# Patient Record
Sex: MALE | Race: WHITE | NOT HISPANIC OR LATINO
[De-identification: names, ages, dates, MRNs, and addresses within clinical notes are randomized per-mention and may not be internally consistent; named-entity substitution may affect disease eponyms.]

---

## 2017-11-08 PROBLEM — Z00.00 ENCOUNTER FOR PREVENTIVE HEALTH EXAMINATION: Status: ACTIVE | Noted: 2017-11-08

## 2017-11-13 ENCOUNTER — APPOINTMENT (OUTPATIENT)
Dept: FAMILY MEDICINE | Facility: CLINIC | Age: 22
End: 2017-11-13
Payer: COMMERCIAL

## 2017-11-13 VITALS
SYSTOLIC BLOOD PRESSURE: 122 MMHG | RESPIRATION RATE: 13 BRPM | HEART RATE: 78 BPM | HEIGHT: 72 IN | DIASTOLIC BLOOD PRESSURE: 70 MMHG | WEIGHT: 190 LBS | BODY MASS INDEX: 25.73 KG/M2 | OXYGEN SATURATION: 98 %

## 2017-11-13 DIAGNOSIS — Z82.49 FAMILY HISTORY OF ISCHEMIC HEART DISEASE AND OTHER DISEASES OF THE CIRCULATORY SYSTEM: ICD-10-CM

## 2017-11-13 DIAGNOSIS — Z02.89 ENCOUNTER FOR OTHER ADMINISTRATIVE EXAMINATIONS: ICD-10-CM

## 2017-11-13 DIAGNOSIS — Z78.9 OTHER SPECIFIED HEALTH STATUS: ICD-10-CM

## 2017-11-13 PROCEDURE — 00024S: CUSTOM

## 2017-11-13 PROCEDURE — 00017S: CUSTOM

## 2017-11-13 PROCEDURE — 00012S: CUSTOM

## 2017-11-13 PROCEDURE — 00001S: CUSTOM

## 2017-11-14 PROBLEM — Z82.49 FAMILY HISTORY OF HYPERTENSION: Status: ACTIVE | Noted: 2017-11-14

## 2017-11-14 PROBLEM — Z78.9 NON-SMOKER: Status: ACTIVE | Noted: 2017-11-13

## 2017-11-14 PROBLEM — Z78.9 CONSUMES ALCOHOL AT SOCIAL EVENTS: Status: ACTIVE | Noted: 2017-11-13

## 2019-08-03 ENCOUNTER — TRANSCRIPTION ENCOUNTER (OUTPATIENT)
Age: 24
End: 2019-08-03

## 2019-10-25 ENCOUNTER — TRANSCRIPTION ENCOUNTER (OUTPATIENT)
Age: 24
End: 2019-10-25

## 2022-05-29 ENCOUNTER — NON-APPOINTMENT (OUTPATIENT)
Age: 27
End: 2022-05-29

## 2022-09-15 ENCOUNTER — EMERGENCY (EMERGENCY)
Facility: HOSPITAL | Age: 27
LOS: 1 days | Discharge: HOME | End: 2022-09-15
Attending: STUDENT IN AN ORGANIZED HEALTH CARE EDUCATION/TRAINING PROGRAM
Payer: COMMERCIAL

## 2022-09-15 ENCOUNTER — TRANSCRIPTION ENCOUNTER (OUTPATIENT)
Age: 27
End: 2022-09-15

## 2022-09-15 VITALS
HEART RATE: 66 BPM | RESPIRATION RATE: 18 BRPM | TEMPERATURE: 98 F | SYSTOLIC BLOOD PRESSURE: 131 MMHG | DIASTOLIC BLOOD PRESSURE: 96 MMHG | OXYGEN SATURATION: 98 %

## 2022-09-15 PROCEDURE — 73564 X-RAY EXAM KNEE 4 OR MORE: CPT | Mod: 26,LT

## 2022-09-15 PROCEDURE — 73564 X-RAY EXAM KNEE 4 OR MORE: CPT

## 2022-09-15 PROCEDURE — 29505 APPLICATION LONG LEG SPLINT: CPT

## 2022-09-15 PROCEDURE — 99283 EMERGENCY DEPT VISIT LOW MDM: CPT

## 2022-09-15 PROCEDURE — 99283 EMERGENCY DEPT VISIT LOW MDM: CPT | Mod: 25

## 2022-09-15 RX ORDER — IBUPROFEN 200 MG
1 TABLET ORAL
Qty: 21 | Refills: 0
Start: 2022-09-15 | End: 2022-09-21

## 2022-09-15 RX ORDER — IBUPROFEN 200 MG
800 TABLET ORAL ONCE
Refills: 0 | Status: COMPLETED | OUTPATIENT
Start: 2022-09-15 | End: 2022-09-15

## 2022-09-15 RX ADMIN — Medication 800 MILLIGRAM(S): at 21:01

## 2022-09-15 NOTE — ED PROVIDER NOTE - CARE PROVIDER_API CALL
Sarita Leiva)  Orthopedics  [address] [place] [address], [address]  [place] [address]  Phone: [phone]  Fax: [phone]  Follow Up Time: 4-6 Days
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
## 2022-09-15 NOTE — ED ADULT NURSE NOTE - NSIMPLEMENTINTERV_GEN_ALL_ED
Implemented All Fall Risk Interventions:  [place] to call system. Call bell, personal items and telephone within reach. Instruct patient to call for assistance. Room bathroom lighting operational. Non-slip footwear when patient is off stretcher. Physically safe environment: no spills, clutter or unnecessary equipment. Stretcher in lowest position, wheels locked, appropriate side rails in place. Provide visual cue, wrist band, yellow gown, etc. Monitor gait and stability. Monitor for mental status changes and reorient to person, place, and time. Review medications for side effects contributing to fall risk. Reinforce activity limits and safety measures with patient and family.

## 2022-09-15 NOTE — ED PROVIDER NOTE - CLINICAL SUMMARY MEDICAL DECISION MAKING FREE TEXT BOX
27 yr old M presented to ED for left knee pain s/p jujitsu injury. pt states that he was practicing when his knee was bent in an abnormal position. Pt admits to pain and hearing a popping sound . Pt says that he feels pain in the lateral aspect of his knee. Examination + knee tenderness on palpation and pt with normal sensation and limited ROM. Pt X-ray normal. placed in knee immobilizer and D/C with orthopedic F/U information.

## 2022-09-15 NOTE — ED PROCEDURE NOTE - CPROC ED POST RADIOGRAPHY1
post-procedure radiography performed/extremity correctly positioned, splinted
What Is The Reason For Today's Visit?: History of Melanoma
Year Excised?: 2019
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
## 2022-09-15 NOTE — ED PROVIDER NOTE - PATIENT PORTAL LINK FT
You can access the FollowMyHealth Patient Portal offered by [facility] by registering at the following website: [email or web address]. By joining [facility]’s FollowMyHealth portal, you will also be able to view your health information using other applications (apps) compatible with our system.

## 2022-09-15 NOTE — ED PROVIDER NOTE - NS ED ATTENDING STATEMENT MOD
This was a shared visit with the CRYSTAL. I reviewed and verified the documentation and independently performed the documented:

## 2022-09-15 NOTE — ED ADULT NURSE NOTE - OBJECTIVE STATEMENT
Patient a&ox4, respirations even and unlabored, complains of left knee pain after twisting his knee at [facility]. Patient denies any previous knee injuries, has not tried to bear weight yet.

## 2022-09-15 NOTE — ED PROVIDER NOTE - OBJECTIVE STATEMENT
27 yr old M presented to ED for left knee pain s/p jujitsu injury. pt states that he was practicing when his knee was bent in an abnormal position. Pt admits to pain and hearing a popping sound . Pt says that he feels pain in the lateral aspect of his knee. Pt also admits to pain with bending his knee and standing . pt denies any weakness, numbness or paraesthesia. Pt denies any significant past medial or surgical illness.

## 2022-09-16 ENCOUNTER — NON-APPOINTMENT (OUTPATIENT)
Age: 27
End: 2022-09-16

## 2022-09-19 ENCOUNTER — APPOINTMENT (OUTPATIENT)
Dept: ORTHOPEDIC SURGERY | Facility: CLINIC | Age: 27
End: 2022-09-19

## 2022-09-19 ENCOUNTER — NON-APPOINTMENT (OUTPATIENT)
Age: 27
End: 2022-09-19

## 2022-09-19 VITALS
SYSTOLIC BLOOD PRESSURE: 127 MMHG | WEIGHT: 180 LBS | BODY MASS INDEX: 24.38 KG/M2 | OXYGEN SATURATION: 99 % | DIASTOLIC BLOOD PRESSURE: 76 MMHG | HEART RATE: 65 BPM | HEIGHT: 72 IN

## 2022-09-19 PROCEDURE — 99204 OFFICE O/P NEW MOD 45 MIN: CPT

## 2022-09-20 NOTE — DISCUSSION/SUMMARY
[de-identified] : DAVID is a 27 year male here today for initial evaluation of his left knee. He states he was doing jiu jitsu on Thursday night 9/15/22 when he felt several pops and injured his knee. He is ambulating today with crutches and is wearing a leg brace. He initially went to [facility] and got xrays, were normal. He got an MRI from an urgent care. He states there is no pain when weight bearing, but twisting motions increase the pain. Patient denies any recent fevers, chills or night sweats. Patient denies any radiating pain, numbness, tingling sensations, burning sensations, urinary or bowel incontinence. \par \par We had a thorough discussion regarding the nature of his pain, the pathophysiology, as well as all treatment options. Based on the clinical exam, radiographs, and MRI reviewed today, the patient has a partial-thickness PCL tear. I discussed operative and non-operative treatment modalities. Patient was advised against surgical intervention at this time. He was transferred to another brace and a PCL brace was ordered today. Patient was given prescription of formal physical therapy that he will perform 2x/wk for 6-8 wks. He can discontinue use of crutches. Patient will follow up in 3-4 wks for repeat clinical assessment and stress view xrays. He can continue working next week as tolerated. All questions were answered and the patient verbalized understanding. The patient is in agreement with this treatment plan.

## 2022-09-20 NOTE — ADDENDUM
[FreeTextEntry1] : Documented by Alona Mccauley acting as a scribe for Dr. العراقي and Jb Merida PA-C on 09/19/2022. \par \par All medical record entries made by the Scribe were at my, Dr. العراقي, and Jb Merida's, direction and\par personally dictated by me on 09/19/2022. I have reviewed the chart and agree that the record\par accurately reflects my personal performance of the history, physical exam, procedure and imaging.

## 2022-09-20 NOTE — HISTORY OF PRESENT ILLNESS
[None] : No relieving factors are noted [de-identified] : DAVID is a 27 year male here today for initial evaluation of his left knee. He states he was doing jiu jitsu on Thursday night 9/15/22 when he felt several pops and injured his knee. He is ambulating today with crutches and is wearing a leg brace. He initially went to [facility] and got xrays, were normal. He got an MRI from an urgent care. He states there is no pain when weight bearing, but twisting motions increase the pain. Patient denies any recent fevers, chills or night sweats. Patient denies any radiating pain, numbness, tingling sensations, burning sensations, urinary or bowel incontinence. He presents with MRI of L knee performed at  which reveals;\par \par \par Partial-thickness PCL tear.\par \par Posterolateral corner injury with partial-thickness tearing of the fibular collateral ligament and popliteus myotendinous junction.\par \par Moderate-sized knee joint effusion.\par

## 2022-09-20 NOTE — PHYSICAL EXAM
[de-identified] : Physical Exam:\par General: Well appearing, no acute distress\par Neurologic: A&Ox3, No focal deficits\par Head: NCAT without abrasions, lacerations, or ecchymosis to head, face, or scalp\par Eyes: No scleral icterus, no gross abnormalities\par Respiratory: Equal chest wall expansion bilaterally, no accessory muscle use\par Lymphatic: No lymphadenopathy palpated\par Skin: Warm and dry\par Psychiatric: Normal mood and affect \par \par \par Left Kne\par Patient is well nourished, well-developed, in no acute distress, with appropriate mood and affect. The patient is A+Ox3. There is no inguinal adenopathy. Bilateral limbs are well-perfused, without skin lesions, shows a grossly normal motor and sensory examination. The left knee motion is significantly reduced and does cause significant pain. Pathologic quad activation, calf tenderness. At 30 and 90 dial slight increase in rotation relative to other knee with pain. Full extension, 105 flexion, ER to 90.  Muscle strength is normal. Pedal pulses are palpable.The knee exhibits a moderate effusion. Joint line tenderness noted [medial/lateral] joint line at 0 and 90 degrees. It is stable in valgus stress at 0 and 30 degrees.The knee exhibits a laxity in posterior drawer. 2+ posterior drawer.  Normal hip and ankle exam.   [de-identified] : DATE OF EXAM: 09/16/2022\par MRI-LEFT KNEE NON CONTRAST - [facility]IRI\par \par IMPRESSION:\par \par Partial-thickness PCL tear.\par \par Posterolateral corner injury with partial-thickness tearing of the fibular collateral ligament and popliteus myotendinous junction.\par \par Moderate-sized knee joint effusion.

## 2022-10-17 ENCOUNTER — APPOINTMENT (OUTPATIENT)
Dept: ORTHOPEDIC SURGERY | Facility: CLINIC | Age: 27
End: 2022-10-17

## 2022-10-17 PROCEDURE — 99214 OFFICE O/P EST MOD 30 MIN: CPT

## 2022-10-17 NOTE — HISTORY OF PRESENT ILLNESS
[de-identified] : DAVID is a 27 year male here today for follow up on left knee. He sustained a left PCL tear on 9/15/2022. He has been using the elliptical at physical therapy. He states he is back at work and is doing well. He was standing for a long time over the weekend and reports increased swelling. He states it is not painful right now. He presents with left knee brace today. \par

## 2022-10-17 NOTE — ADDENDUM
[FreeTextEntry1] : Documented by Alona Mccauley acting as a scribe for Dr. العراقي and Jb Merida PA-C on 10/17/2022. \par \par All medical record entries made by the Scribe were at my, Dr. العراقي, and Jb Merida's, direction and\par personally dictated by me on 10/17/2022. I have reviewed the chart and agree that the record\par accurately reflects my personal performance of the history, physical exam, procedure and imaging.

## 2022-10-17 NOTE — PHYSICAL EXAM
[de-identified] : Physical Exam:\par General: Well appearing, no acute distress\par Neurologic: A&Ox3, No focal deficits\par Head: NCAT without abrasions, lacerations, or ecchymosis to head, face, or scalp\par Eyes: No scleral icterus, no gross abnormalities\par Respiratory: Equal chest wall expansion bilaterally, no accessory muscle use\par Lymphatic: No lymphadenopathy palpated\par Skin: Warm and dry\par Psychiatric: Normal mood and affect \par \par \par Left Knee\par Patient is well nourished, well-developed, in no acute distress, with appropriate mood and affect. The patient is A+Ox3. There is no inguinal adenopathy. Bilateral limbs are well-perfused, without skin lesions, shows a grossly normal motor and sensory examination. The left knee motion is 0-140. Full extension.  Muscle strength is normal. Pedal pulses are palpable.The knee exhibits a moderate effusion. No joint line tenderness noted [medial/lateral] joint line at 0 and 90 degrees. It is stable in valgus stress at 0 and 30 degrees.The knee exhibits a laxity in posterior drawer. 2+ posterior drawer.  Normal hip and ankle exam. Grade 1 PCL with endpoint.

## 2022-10-17 NOTE — DISCUSSION/SUMMARY
[de-identified] : DAVID is a 27 year male here today for follow up on left knee. He sustained a left PCL tear on 9/15/2022. He has been using the elliptical at physical therapy. He states he is back at work and is doing well. He was standing for a long time over the weekend and reports increased swelling. He states it is not painful right now. He presents with left knee brace today. \par \par At this time, he is doing well and denies pain. He should continue physical therapy and home exercises. The swelling is likely due to the long period of standing over the weekend. Conservative measures of treatment include rest until asymptomatic, activity avoidance, NSAID's PRN, application to ice to the area 2-3x daily for 20 minutes, with gradual return to activities. He should continue use of the brace. Patient will follow up in 6-8 wks for repeat clinical assessment. All questions were answered and the patient verbalized understanding. The patient is in agreement with this treatment plan.

## 2022-11-29 ENCOUNTER — APPOINTMENT (OUTPATIENT)
Dept: ORTHOPEDIC SURGERY | Facility: CLINIC | Age: 27
End: 2022-11-29

## 2022-11-29 PROCEDURE — 99213 OFFICE O/P EST LOW 20 MIN: CPT

## 2022-11-29 NOTE — PHYSICAL EXAM
[de-identified] : Physical Exam:\par General: Well appearing, no acute distress\par Neurologic: A&Ox3, No focal deficits\par Head: NCAT without abrasions, lacerations, or ecchymosis to head, face, or scalp\par Eyes: No scleral icterus, no gross abnormalities\par Respiratory: Equal chest wall expansion bilaterally, no accessory muscle use\par Lymphatic: No lymphadenopathy palpated\par Skin: Warm and dry\par Psychiatric: Normal mood and affect \par \par \par Left Knee\par Patient is well nourished, well-developed, in no acute distress, with appropriate mood and affect. The patient is A+Ox3. There is no inguinal adenopathy. Bilateral limbs are well-perfused, without skin lesions, shows a grossly normal motor and sensory examination. The left knee motion is 0-140. Full extension.  Muscle strength is normal. Pedal pulses are palpable.The knee exhibits a moderate effusion. No joint line tenderness noted [medial/lateral] joint line at 0 and 90 degrees. It is stable in valgus stress at 0 and 30 degrees.The knee exhibits a laxity in posterior drawer. 2+ posterior drawer.  Normal hip and ankle exam. Grade 1 PCL with endpoint.  IT band tenderness. Slight tibial slope. [de-identified] : No new imaging done today.

## 2022-11-29 NOTE — DISCUSSION/SUMMARY
[de-identified] : DAVID is a 27 year male here today for follow up on left knee. He sustained a left PCL tear on 9/15/2022. He states he is doing well and has been doing PT with [facility]. He states he has been going to the gym. He feels 85-95% back to normal. He still has pain with leg curls and anything explosive. Denies paresthesias.\par \par \par We had a thorough discussion regarding the nature of his pain, the pathophysiology, as well as all treatment options. He should continue doing PT exercises at home and with exercise at the gym such as the elliptical and single leg eccentric exercises. If he is feeling comfortable he can start to do light work on the treadmill. He will follow up with me on an as needed basis. Patient will follow up in 6-8 wks for repeat clinical assessment. All questions were answered and the patient verbalized understanding. The patient is in agreement with this treatment plan.

## 2022-11-29 NOTE — ADDENDUM
[FreeTextEntry1] : Documented by Angie Gagnon acting as a scribe for Dr. العراقي and Jb Merida PA-C on 11/29/2022.   All medical record entries made by the Scribe were at my, Dr. العراقي, and Jb Merida's, direction and personally dictated by me on 11/29/2022. I have reviewed the chart and agree that the record accurately reflects my personal performance of the history, physical exam, procedure and imaging.

## 2022-11-29 NOTE — HISTORY OF PRESENT ILLNESS
[de-identified] : DAVID is a 27 year male here today for follow up on left knee. He sustained a left PCL tear on 9/15/2022. He states he is doing well and has been doing PT with [facility]. He states he has been going to the gym. He feels 85-95% back to normal. He still has pain with leg curls and anything explosive. Denies paresthesias.\par

## 2023-01-24 ENCOUNTER — APPOINTMENT (OUTPATIENT)
Dept: ORTHOPEDIC SURGERY | Facility: CLINIC | Age: 28
End: 2023-01-24
Payer: COMMERCIAL

## 2023-01-24 DIAGNOSIS — S83.529A SPRAIN OF POSTERIOR CRUCIATE LIGAMENT OF UNSPECIFIED KNEE, INITIAL ENCOUNTER: ICD-10-CM

## 2023-01-24 DIAGNOSIS — S89.92XS UNSPECIFIED INJURY OF LEFT LOWER LEG, SEQUELA: ICD-10-CM

## 2023-01-24 PROCEDURE — 99214 OFFICE O/P EST MOD 30 MIN: CPT

## 2023-01-24 NOTE — DISCUSSION/SUMMARY
[de-identified] : We had a thorough discussion regarding the nature of his pain, the pathophysiology, as well as all treatment options. Patient is doing well at this time. He should continue with strengthening and stretching at the gym. I recommended him to work on strengthening his quadriceps and stretching his IT band. He should begin doing some explosive exercises. He can begin doing exercises on the mat, but no contact. Patient will follow up in 12 wks for repeat clinical assessment. All questions were answered and the patient verbalized understanding. The patient is in agreement with this treatment plan.

## 2023-01-24 NOTE — ADDENDUM
[FreeTextEntry1] : Documented by Angie Gagnon acting as a scribe for Dr. العراقي and Jb Merida PA-C on 01/24/2023.   All medical record entries made by the Scribe were at my, Dr. العراقي, and Jb Merida's, direction and personally dictated by me on 01/24/2023. I have reviewed the chart and agree that the record accurately reflects my personal performance of the history, physical exam, procedure and imaging.

## 2023-01-24 NOTE — DISCUSSION/SUMMARY
[de-identified] : We had a thorough discussion regarding the nature of his pain, the pathophysiology, as well as all treatment options. Patient is doing well at this time. He should continue with strengthening and stretching at the gym. I recommended him to work on strengthening his quadriceps and stretching his IT band. He should begin doing some explosive exercises. He can begin doing exercises on the mat, but no contact. Patient will follow up in 12 wks for repeat clinical assessment. All questions were answered and the patient verbalized understanding. The patient is in agreement with this treatment plan.

## 2023-01-24 NOTE — PHYSICAL EXAM
[de-identified] : Physical Exam:\par General: Well appearing, no acute distress\par Neurologic: A&Ox3, No focal deficits\par Head: NCAT without abrasions, lacerations, or ecchymosis to head, face, or scalp\par Eyes: No scleral icterus, no gross abnormalities\par Respiratory: Equal chest wall expansion bilaterally, no accessory muscle use\par Lymphatic: No lymphadenopathy palpated\par Skin: Warm and dry\par Psychiatric: Normal mood and affect \par \par \par Left Knee\par Patient is well nourished, well-developed, in no acute distress, with appropriate mood and affect. The patient is A+Ox3. There is no inguinal adenopathy. Bilateral limbs are well-perfused, without skin lesions, shows a grossly normal motor and sensory examination. The left knee motion is 0-135. Full extension.  Muscle strength is normal. Pedal pulses are palpable.The knee exhibits a moderate effusion. No joint line tenderness noted [medial/lateral] joint line at 0 and 90 degrees. It is stable in valgus stress at 0 and 30 degrees.The knee exhibits a laxity in posterior drawer. 2+ posterior drawer.  Normal hip and ankle exam. Grade 1 PCL with endpoint.  IT band tenderness. Slight tibial slope.

## 2023-01-24 NOTE — HISTORY OF PRESENT ILLNESS
[de-identified] : DAVID is a 27 year male here today for follow up on left knee. He sustained a left PCL tear on 9/15/2022. He notes he has been back to the gym and strengthening himself. He has tried some kickboxing. He went surfing with little to no issues. \par

## 2023-01-24 NOTE — PHYSICAL EXAM
[de-identified] : Physical Exam:\par General: Well appearing, no acute distress\par Neurologic: A&Ox3, No focal deficits\par Head: NCAT without abrasions, lacerations, or ecchymosis to head, face, or scalp\par Eyes: No scleral icterus, no gross abnormalities\par Respiratory: Equal chest wall expansion bilaterally, no accessory muscle use\par Lymphatic: No lymphadenopathy palpated\par Skin: Warm and dry\par Psychiatric: Normal mood and affect \par \par \par Left Knee\par Patient is well nourished, well-developed, in no acute distress, with appropriate mood and affect. The patient is A+Ox3. There is no inguinal adenopathy. Bilateral limbs are well-perfused, without skin lesions, shows a grossly normal motor and sensory examination. The left knee motion is 0-135. Full extension.  Muscle strength is normal. Pedal pulses are palpable.The knee exhibits a moderate effusion. No joint line tenderness noted [medial/lateral] joint line at 0 and 90 degrees. It is stable in valgus stress at 0 and 30 degrees.The knee exhibits a laxity in posterior drawer. 2+ posterior drawer.  Normal hip and ankle exam. Grade 1 PCL with endpoint.  IT band tenderness. Slight tibial slope.

## 2023-01-24 NOTE — HISTORY OF PRESENT ILLNESS
[de-identified] : DAVID is a 27 year male here today for follow up on left knee. He sustained a left PCL tear on 9/15/2022. He notes he has been back to the gym and strengthening himself. He has tried some kickboxing. He went surfing with little to no issues. \par

## 2023-04-18 ENCOUNTER — APPOINTMENT (OUTPATIENT)
Dept: ORTHOPEDIC SURGERY | Facility: CLINIC | Age: 28
End: 2023-04-18

## 2023-11-17 ENCOUNTER — NON-APPOINTMENT (OUTPATIENT)
Age: 28
End: 2023-11-17

## 2024-02-06 ENCOUNTER — NON-APPOINTMENT (OUTPATIENT)
Age: 29
End: 2024-02-06

## 2024-02-06 ENCOUNTER — APPOINTMENT (OUTPATIENT)
Dept: ORTHOPEDIC SURGERY | Facility: CLINIC | Age: 29
End: 2024-02-06
Payer: COMMERCIAL

## 2024-02-06 DIAGNOSIS — S83.91XA SPRAIN OF UNSPECIFIED SITE OF RIGHT KNEE, INITIAL ENCOUNTER: ICD-10-CM

## 2024-02-06 PROCEDURE — 73564 X-RAY EXAM KNEE 4 OR MORE: CPT | Mod: RT

## 2024-02-06 PROCEDURE — 99214 OFFICE O/P EST MOD 30 MIN: CPT

## 2024-02-06 RX ORDER — NAPROXEN 500 MG/1
500 TABLET ORAL
Qty: 28 | Refills: 0 | Status: ACTIVE | COMMUNITY
Start: 2024-02-06 | End: 1900-01-01

## 2024-02-06 NOTE — HISTORY OF PRESENT ILLNESS
[de-identified] : DAVID is a 28 year male here today for new, right knee pain. He injured himself in [facility]. He sustained a left PCL tear on 9/15/2022 that he recovered well from and treated conservatively.

## 2024-02-06 NOTE — CONSULT LETTER
[Dear  ___] : Dear  [unfilled], [Consult Letter:] : I had the pleasure of evaluating your patient, [unfilled]. [Please see my note below.] : Please see my note below. [Sincerely,] : Sincerely, [FreeTextEntry3] : Dr. Mj العراقي

## 2024-02-06 NOTE — DISCUSSION/SUMMARY
[de-identified] : We had a thorough discussion regarding the nature of his pain, the pathophysiology, as well as all treatment options. A prescription of Naproxen was given to be taken as directed with food to prevent GI upset, if occurs pt to D/C and call us at that time. Considering the patient's current presentation of pain, physical exam, and radiographs an MRI is indicated at this time. A prescription for this was given to the patient. We will go over the MRI results with him upon obtaining the results in the office and advise him of further treatment options. He agrees with the above plan and all questions were answered.

## 2024-02-06 NOTE — ADDENDUM
[FreeTextEntry1] : Documented by Kika Silva acting as a scribe for Dr. العراقي on 02/06/2024. All medical record entries made by the Scribe were at my, Dr. العراقي's, direction and personally dictated by me on 02/06/2024. I have reviewed the chart and agree that the record accurately reflects my personal performance of the history, physical exam, procedure and imaging.

## 2024-02-06 NOTE — PHYSICAL EXAM
[de-identified] : General: Well appearing, no acute distress Neurologic: A&Ox3, No focal deficits Head: NCAT without abrasions, lacerations, or ecchymosis to head, face, or scalp Eyes: No scleral icterus, no gross abnormalities Respiratory: Equal chest wall expansion bilaterally, no accessory muscle use Lymphatic: No lymphadenopathy palpated Skin: Warm and dry Psychiatric: Normal mood and affect   Examination of the right knee reveals a significant effusion, no erythema or ecchymosis. There are no leg length discrepancies appreciated. The right knee is slightly larger than the left. The patient's range of motion is from 0-115 limited by pain. The patient has no pain with patella mobility or apprehension. The patient displays tenderness to palpation in the medial and lateral joint lines but more so in the medial joint line. There is no patella facet tenderness. The patient has a 4.5 out of 5 strength resisted straight leg raising as well as knee flexion and extension. The knee demonstrates 2+ laxity to Lachman testing, as well as anterior drawer. Unable to tolerate a pivot shift. Stable to posterior drawer. There is no valgus or varus instability. The patient has pain with Anthony and Grind Testing. The calf and thigh are soft, supple and nontender. The patient is grossly neurovascularly intact distally. [de-identified] : The following radiographs were ordered and read by me during this patient's visit. I reviewed each radiograph in detail with the patient and discussed the findings as highlighted below. 4 views of the right knee were obtained today that show no fracture, dislocation. There is no degenerative change seen. There is no malalignment. No obvious osseous abnormality. Otherwise unremarkable.

## 2024-02-07 ENCOUNTER — OUTPATIENT (OUTPATIENT)
Dept: OUTPATIENT SERVICES | Facility: HOSPITAL | Age: 29
LOS: 1 days | End: 2024-02-07
Payer: COMMERCIAL

## 2024-02-07 ENCOUNTER — APPOINTMENT (OUTPATIENT)
Dept: MRI IMAGING | Facility: CLINIC | Age: 29
End: 2024-02-07
Payer: COMMERCIAL

## 2024-02-07 DIAGNOSIS — S83.91XA SPRAIN OF UNSPECIFIED SITE OF RIGHT KNEE, INITIAL ENCOUNTER: ICD-10-CM

## 2024-02-07 PROCEDURE — 73721 MRI JNT OF LWR EXTRE W/O DYE: CPT

## 2024-02-07 PROCEDURE — 73721 MRI JNT OF LWR EXTRE W/O DYE: CPT | Mod: 26,RT

## 2024-02-12 ENCOUNTER — APPOINTMENT (OUTPATIENT)
Dept: ORTHOPEDIC SURGERY | Facility: CLINIC | Age: 29
End: 2024-02-12
Payer: COMMERCIAL

## 2024-02-12 VITALS — WEIGHT: 185 LBS | BODY MASS INDEX: 25.06 KG/M2 | HEIGHT: 72 IN

## 2024-02-12 PROCEDURE — 99214 OFFICE O/P EST MOD 30 MIN: CPT

## 2024-02-12 NOTE — DISCUSSION/SUMMARY
[de-identified] : We had a thorough discussion regarding the nature of his pain, the pathophysiology, as well as all treatment options. Based on clinical exam, MRI and radiograph findings they have a right ACL tear. I discussed operative and non-operative treatment modalities. All risks, benefits and alternatives to the proposed surgical procedure, right ACL reconstruction with quad tendon autograft, were discussed in great detail with the patient. Risks include but are not limited to pain, bleeding, infection, stiffness, medical complications (including DVT, PE, MI), and risks of anesthesia. The patient expressed understanding and all questions were answered. The patient is electing to proceed, and I will have the patient scheduled accordingly. I provided him contact number of my surgical coordinator Neto, who will go over dates for this procedure. All questions were answered.

## 2024-02-12 NOTE — PHYSICAL EXAM
[de-identified] : General: Well appearing, no acute distress Neurologic: A&Ox3, No focal deficits Head: NCAT without abrasions, lacerations, or ecchymosis to head, face, or scalp Eyes: No scleral icterus, no gross abnormalities Respiratory: Equal chest wall expansion bilaterally, no accessory muscle use Lymphatic: No lymphadenopathy palpated Skin: Warm and dry Psychiatric: Normal mood and affect   Examination of the right knee reveals a significant effusion, no erythema or ecchymosis. There are no leg length discrepancies appreciated. The right knee is slightly larger than the left. The patient's range of motion is from 0-115 limited by pain. The patient has no pain with patella mobility or apprehension. The patient displays tenderness to palpation in the medial and lateral joint lines but more so in the medial joint line. There is no patella facet tenderness. The patient has a 4.5 out of 5 strength resisted straight leg raising as well as knee flexion and extension. The knee demonstrates 2+ laxity to Lachman testing, as well as anterior drawer. Unable to tolerate a pivot shift. Stable to posterior drawer. There is no valgus or varus instability. The patient has pain with Anthony and Grind Testing. The calf and thigh are soft, supple and nontender. The patient is grossly neurovascularly intact distally. [de-identified] : MRI [facility]  EXAM: [id number] - MR KNEE RT  - ORDERED BY:  PATTI LOGAN  PROCEDURE DATE:  02/07/2024  IMPRESSION: Chronic full-thickness tear of the ACL. Mild edema at the medial aspect of the medial femoral condyle, possibly contusion or stress reaction. No meniscal tear.

## 2024-02-12 NOTE — HISTORY OF PRESENT ILLNESS
[de-identified] : DAVID is a 28 year male who presents today for an MRI review of his right knee from a [facility] facility. He is active and does [email or web address].

## 2024-02-12 NOTE — ADDENDUM
[FreeTextEntry1] : Documented by Kika Silva acting as a scribe for Dr. العراقي on 02/12/2024. All medical record entries made by the Scribe were at my, Dr. العراقي's, direction and personally dictated by me on 02/12/2024. I have reviewed the chart and agree that the record accurately reflects my personal performance of the history, physical exam, procedure and imaging.

## 2024-02-23 ENCOUNTER — APPOINTMENT (OUTPATIENT)
Dept: ORTHOPEDIC SURGERY | Facility: AMBULATORY SURGERY CENTER | Age: 29
End: 2024-02-23
Payer: COMMERCIAL

## 2024-02-23 PROCEDURE — 29888 ARTHRS AID ACL RPR/AGMNTJ: CPT | Mod: RT

## 2024-02-23 PROCEDURE — 29882 ARTHRS KNE SRG MNISC RPR M/L: CPT | Mod: RT

## 2024-02-23 RX ORDER — OXYCODONE 5 MG/1
5 TABLET ORAL
Qty: 20 | Refills: 0 | Status: ACTIVE | COMMUNITY
Start: 2024-02-23 | End: 1900-01-01

## 2024-02-23 RX ORDER — ASPIRIN ENTERIC COATED TABLETS 81 MG 81 MG/1
81 TABLET, DELAYED RELEASE ORAL DAILY
Qty: 14 | Refills: 0 | Status: ACTIVE | COMMUNITY
Start: 2024-02-23 | End: 1900-01-01

## 2024-02-23 RX ORDER — ONDANSETRON 4 MG/1
4 TABLET ORAL
Qty: 42 | Refills: 0 | Status: COMPLETED | COMMUNITY
Start: 2024-02-23 | End: 2024-03-01

## 2024-03-07 ENCOUNTER — APPOINTMENT (OUTPATIENT)
Dept: ORTHOPEDIC SURGERY | Facility: CLINIC | Age: 29
End: 2024-03-07
Payer: COMMERCIAL

## 2024-03-07 PROCEDURE — 99024 POSTOP FOLLOW-UP VISIT: CPT

## 2024-03-07 PROCEDURE — 73560 X-RAY EXAM OF KNEE 1 OR 2: CPT | Mod: RT

## 2024-03-07 RX ORDER — NAPROXEN 500 MG/1
500 TABLET ORAL
Qty: 60 | Refills: 0 | Status: COMPLETED | COMMUNITY
Start: 2024-03-07 | End: 2024-04-06

## 2024-03-07 NOTE — ADDENDUM
[FreeTextEntry1] : Documented by Kika Silva acting as a scribe for Dr. العراقي on 03/07/2024. All medical record entries made by the Scribe were at my, Dr. العراقي's, direction and personally dictated by me on 03/07/2024. I have reviewed the chart and agree that the record accurately reflects my personal performance of the history, physical exam, procedure and imaging.

## 2024-03-07 NOTE — HISTORY OF PRESENT ILLNESS
[___ Weeks Post Op] : [unfilled] weeks post op [Xray (Date:___)] : [unfilled] Xray -  [Doing Well] : is doing well [No Sign of Infection] : is showing no signs of infection [Adequate Pain Control] : has adequate pain control [de-identified] : SPO Right knee arthroscopy, ACL reconstruction with quadriceps tendon autograft  DOS: 2/23/24 [de-identified] : Patient is here today for 1st post operative visit. SPO Right knee arthroscopy, ACL reconstruction with quadriceps tendon autograft  DOS: 2/23/24 He denies fever or chills, redness around or near the incision site(s), numbness/tingling. He denies nausea/ vomiting and admits to their appetite since their surgery being back to normal. Normal bowel habits at this time. Patient has started physical therapy. Patient presents today ambulating with crutches and a Bledsole brace. Patient since their surgery has utilized Naproxen.  [de-identified] : [Insert Laterality] Knee There is moderate effusion without warmth and mild ecchymosis throughout the leg. Knee motion is 0 - 90 degrees of passive ROM, straight leg raise [with or without] extension lag and pain free. [Weakened versus Good] quad strength. Full sensation intact and dorsalis pedis pulses 2+. Special Tests: NEG Lachman, NEG anterior drawer, NEG posterior drawer with collateral testing and varus/valgus normal.NEG Homans, no calf tenderness, soft and compressible. The surgical incision site(s) was clean, dry and intact. Additional findings included an unremarkable neurological exam and peripheral vascular exam normal. [de-identified] : I had a discussion with the patient regarding the operative and postoperative course. The patient is doing well. He should continue with physical therapy. A prescription of Naproxen was given to be taken as directed with food to prevent GI upset, if occurs pt to D/C and call us at that time. Patient will follow up in 4 wks for repeat clinical assessment. All questions were answered and the patient verbalized understanding. The patient is in agreement with this treatment plan. [de-identified] : The following radiographs were ordered and read by me during this patient's visit. I reviewed each radiograph in detail with the patient and discussed the findings as highlighted below. 2 views of the Right knee were obtained today that show no fracture, dislocation. There is no degenerative change seen. There is no malalignment. ACL fixation appears intact. No obvious osseous abnormality. Otherwise unremarkable.

## 2024-03-13 RX ORDER — NAPROXEN 500 MG/1
500 TABLET ORAL
Qty: 30 | Refills: 0 | Status: ACTIVE | COMMUNITY
Start: 2024-03-13 | End: 1900-01-01

## 2024-04-02 ENCOUNTER — APPOINTMENT (OUTPATIENT)
Dept: ORTHOPEDIC SURGERY | Facility: CLINIC | Age: 29
End: 2024-04-02
Payer: COMMERCIAL

## 2024-04-02 PROCEDURE — 99024 POSTOP FOLLOW-UP VISIT: CPT

## 2024-04-02 NOTE — HISTORY OF PRESENT ILLNESS
[___ Weeks Post Op] : [unfilled] weeks post op [Doing Well] : is doing well [Adequate Pain Control] : has adequate pain control [No Sign of Infection] : is showing no signs of infection [de-identified] : SPO Right knee arthroscopy, ACL reconstruction with quadriceps tendon autograft  DOS: 2/23/24 [de-identified] : Patient is here today for post operative visit. SPO Right knee arthroscopy, ACL reconstruction with quadriceps tendon autograft, 5 weeks ago. He denies fever or chills, redness around or near the incision site(s), numbness/tingling. He denies nausea/ vomiting and admits to their appetite since their surgery being back to normal. Normal bowel habits at this time. Patient has been going to physical therapy. Patient presents today ambulating with Bledsole brace unlocked. Patient since their surgery has utilized Naproxen.  He is attending physical therapy with Jb Sanchez. [de-identified] : The right knee is examined and reveals well-healed incisions.  He has mild swelling around the knee.  There is no erythema or warmth.  He has range of motion from 0 to 120 degrees fluidly.  He has good quad tone and good straight leg raise against resistance.  He is stable to Lachman testing as well as valgus and varus stress.  His calf is soft and nontender.  He is grossly neurovascular intact distally. [de-identified] : I had a discussion with the patient regarding the operative and postoperative course. The patient is doing well. He should continue with physical therapy.  He will follow the rehab protocol.  I cautioned him against doing too much too soon.  He will follow-up in about 6 weeks.  All questions were answered. [de-identified] : No new imaging performed today.

## 2024-04-19 ENCOUNTER — NON-APPOINTMENT (OUTPATIENT)
Age: 29
End: 2024-04-19

## 2024-04-30 ENCOUNTER — APPOINTMENT (OUTPATIENT)
Dept: ORTHOPEDIC SURGERY | Facility: CLINIC | Age: 29
End: 2024-04-30

## 2024-05-14 ENCOUNTER — APPOINTMENT (OUTPATIENT)
Dept: ORTHOPEDIC SURGERY | Facility: CLINIC | Age: 29
End: 2024-05-14
Payer: COMMERCIAL

## 2024-05-14 DIAGNOSIS — S89.91XD UNSPECIFIED INJURY OF RIGHT LOWER LEG, SUBSEQUENT ENCOUNTER: ICD-10-CM

## 2024-05-14 PROCEDURE — 99024 POSTOP FOLLOW-UP VISIT: CPT

## 2024-05-15 NOTE — HISTORY OF PRESENT ILLNESS
[Doing Well] : is doing well [No Sign of Infection] : is showing no signs of infection [Adequate Pain Control] : has adequate pain control [de-identified] : SPO Right knee arthroscopy, ACL reconstruction with quadriceps tendon autograft  DOS: 2/23/24 [de-identified] : Patient is here today for post operative visit. SPO Right knee arthroscopy, ACL reconstruction with quadriceps tendon autograft, 10 weeks ago. He is working diligently with physical therapy as well as working on the gym and doing a home program.  He denies buckling or instability.  He is doing exceptionally well.  He feels he is ready to run. [de-identified] : The right knee is examined and reveals well-healed incisions.  He has mild swelling around the knee.  There is no erythema or warmth.  He has range of motion from 0 to 128 degrees fluidly.  He has good quad tone and good straight leg raise against resistance.  He is stable to Lachman testing as well as valgus and varus stress.  His calf is soft and nontender.  He is grossly neurovascular intact distally. [de-identified] : No new imaging performed today. [de-identified] : I had a discussion with the patient regarding the operative and postoperative course. The patient is doing well. He should continue with physical therapy.  He will follow the rehab protocol.  I cautioned him against doing too much too soon.  He will follow-up in about 12 weeks.  All questions were answered.

## 2024-08-20 ENCOUNTER — APPOINTMENT (OUTPATIENT)
Dept: ORTHOPEDIC SURGERY | Facility: CLINIC | Age: 29
End: 2024-08-20
Payer: COMMERCIAL

## 2024-08-20 VITALS
HEIGHT: 72 IN | SYSTOLIC BLOOD PRESSURE: 123 MMHG | DIASTOLIC BLOOD PRESSURE: 76 MMHG | BODY MASS INDEX: 25.06 KG/M2 | WEIGHT: 185 LBS | HEART RATE: 57 BPM

## 2024-08-20 DIAGNOSIS — S89.91XD UNSPECIFIED INJURY OF RIGHT LOWER LEG, SUBSEQUENT ENCOUNTER: ICD-10-CM

## 2024-08-20 PROCEDURE — 99214 OFFICE O/P EST MOD 30 MIN: CPT

## 2024-08-20 NOTE — HISTORY OF PRESENT ILLNESS
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  Date of surgery 2/23/2024.  Patient reports he is doing very well.  He is running and working on the gym.  He is doing a bridge program at professional [facility] in [place].  He reports no buckling or instability.  He has no pain.  He eventually would like to return to [employer] as well as flag football.

## 2024-08-20 NOTE — PHYSICAL EXAM
[de-identified] : Physical Exam:  General: Well appearing, no acute distress  Neurologic: A&Ox3, No focal deficits  Head: NCAT without abrasions, lacerations, or ecchymosis to head, face, or scalp  Eyes: No scleral icterus, no gross abnormalities  Respiratory: Equal chest wall expansion bilaterally, no accessory muscle use  Lymphatic: No lymphadenopathy palpated  Skin: Warm and dry  Psychiatric: Normal mood and affect  Right knee incisions are well-healed.  There is still mild quad atrophy appreciated.  Range of motion is from 0 to 130 degrees of flexion.  No pain with Anthony or grind testing.  He is stable to valgus and varus stress.  He is stable to Lachman testing as well as anterior and posterior drawer.  He has mild weakness to Quad strength compared to his contralateral side as well as straight leg raising.  His compartments are soft and nontender.  He is grossly neurovascular intact distally.

## 2024-08-20 NOTE — PHYSICAL EXAM
[de-identified] : Physical Exam:  General: Well appearing, no acute distress  Neurologic: A&Ox3, No focal deficits  Head: NCAT without abrasions, lacerations, or ecchymosis to head, face, or scalp  Eyes: No scleral icterus, no gross abnormalities  Respiratory: Equal chest wall expansion bilaterally, no accessory muscle use  Lymphatic: No lymphadenopathy palpated  Skin: Warm and dry  Psychiatric: Normal mood and affect  Right knee incisions are well-healed.  There is still mild quad atrophy appreciated.  Range of motion is from 0 to 130 degrees of flexion.  No pain with Anthony or grind testing.  He is stable to valgus and varus stress.  He is stable to Lachman testing as well as anterior and posterior drawer.  He has mild weakness to Quad strength compared to his contralateral side as well as straight leg raising.  His compartments are soft and nontender.  He is grossly neurovascular intact distally.

## 2024-08-20 NOTE — DISCUSSION/SUMMARY
[de-identified] : I had a discussion with the patient regarding his condition.  We discussed the treatment plan.  At this time I recommended he continue with a strengthening program.  He will follow-up in about 2 months for repeat evaluation, will likely consider a functional knee brace.  We also discussed that with rene he would likely not be able to use a brace.  We will consider gradual return.  He is on board with this.  All of his questions were answered.

## 2024-08-20 NOTE — DISCUSSION/SUMMARY
[de-identified] : I had a discussion with the patient regarding his condition.  We discussed the treatment plan.  At this time I recommended he continue with a strengthening program.  He will follow-up in about 2 months for repeat evaluation, will likely consider a functional knee brace.  We also discussed that with rene he would likely not be able to use a brace.  We will consider gradual return.  He is on board with this.  All of his questions were answered.

## 2024-08-20 NOTE — DISCUSSION/SUMMARY
[de-identified] : I had a discussion with the patient regarding his condition.  We discussed the treatment plan.  At this time I recommended he continue with a strengthening program.  He will follow-up in about 2 months for repeat evaluation, will likely consider a functional knee brace.  We also discussed that with rene he would likely not be able to use a brace.  We will consider gradual return.  He is on board with this.  All of his questions were answered.

## 2024-08-20 NOTE — HISTORY OF PRESENT ILLNESS
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  Date of surgery 2/23/2024.  Patient reports he is doing very well.  He is running and working on the gym.  He is doing a bridge program at [facility] in [place].  He reports no buckling or instability.  He has no pain.  He eventually would like to return to [employer] as well as flag football.

## 2024-08-20 NOTE — PHYSICAL EXAM
[de-identified] : Physical Exam:  General: Well appearing, no acute distress  Neurologic: A&Ox3, No focal deficits  Head: NCAT without abrasions, lacerations, or ecchymosis to head, face, or scalp  Eyes: No scleral icterus, no gross abnormalities  Respiratory: Equal chest wall expansion bilaterally, no accessory muscle use  Lymphatic: No lymphadenopathy palpated  Skin: Warm and dry  Psychiatric: Normal mood and affect  Right knee incisions are well-healed.  There is still mild quad atrophy appreciated.  Range of motion is from 0 to 130 degrees of flexion.  No pain with Anthony or grind testing.  He is stable to valgus and varus stress.  He is stable to Lachman testing as well as anterior and posterior drawer.  He has mild weakness to Quad strength compared to his contralateral side as well as straight leg raising.  His compartments are soft and nontender.  He is grossly neurovascular intact distally.

## 2024-08-20 NOTE — HISTORY OF PRESENT ILLNESS
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  Date of surgery 2/23/2024.  Patient reports he is doing very well.  He is running and working on the gym.  He is doing a bridge program at [facility] in [place].  He reports no buckling or instability.  He has no pain.  He eventually would like to return to [employer] as well as flag football.

## 2024-08-20 NOTE — REASON FOR VISIT
[Follow-Up Visit] : a follow-up visit for [Aftercare Following Surgery] : aftercare following surgery [FreeTextEntry2] : SPO Right knee arthroscopy, ACL reconstruction with quadriceps tendon autograft DOS: 2/23/24.

## 2024-10-22 ENCOUNTER — APPOINTMENT (OUTPATIENT)
Dept: ORTHOPEDIC SURGERY | Facility: CLINIC | Age: 29
End: 2024-10-22
Payer: COMMERCIAL

## 2024-10-22 DIAGNOSIS — S89.91XD UNSPECIFIED INJURY OF RIGHT LOWER LEG, SUBSEQUENT ENCOUNTER: ICD-10-CM

## 2024-10-22 PROCEDURE — 99214 OFFICE O/P EST MOD 30 MIN: CPT

## 2025-02-18 ENCOUNTER — APPOINTMENT (OUTPATIENT)
Dept: ORTHOPEDIC SURGERY | Facility: CLINIC | Age: 30
End: 2025-02-18
Payer: COMMERCIAL

## 2025-02-18 DIAGNOSIS — S89.91XD UNSPECIFIED INJURY OF RIGHT LOWER LEG, SUBSEQUENT ENCOUNTER: ICD-10-CM

## 2025-02-18 DIAGNOSIS — S89.92XS UNSPECIFIED INJURY OF LEFT LOWER LEG, SEQUELA: ICD-10-CM

## 2025-02-18 PROCEDURE — 99213 OFFICE O/P EST LOW 20 MIN: CPT

## 2025-07-25 ENCOUNTER — NON-APPOINTMENT (OUTPATIENT)
Age: 30
End: 2025-07-25

## 2025-09-07 ENCOUNTER — NON-APPOINTMENT (OUTPATIENT)
Age: 30
End: 2025-09-07